# Patient Record
Sex: MALE | Race: WHITE | NOT HISPANIC OR LATINO | Employment: OTHER | URBAN - METROPOLITAN AREA
[De-identification: names, ages, dates, MRNs, and addresses within clinical notes are randomized per-mention and may not be internally consistent; named-entity substitution may affect disease eponyms.]

---

## 2024-03-08 PROBLEM — R01.1 MURMUR: Status: ACTIVE | Noted: 2024-03-08

## 2024-03-08 PROBLEM — I10 HYPERTENSION: Status: ACTIVE | Noted: 2023-05-26

## 2024-03-08 PROBLEM — I10 BENIGN ESSENTIAL HYPERTENSION: Status: ACTIVE | Noted: 2024-03-08

## 2024-03-08 PROBLEM — E11.9 TYPE 2 DIABETES MELLITUS WITHOUT COMPLICATION (MULTI): Status: ACTIVE | Noted: 2023-05-26

## 2024-03-08 PROBLEM — E78.5 DYSLIPIDEMIA: Status: ACTIVE | Noted: 2024-03-08

## 2024-03-08 PROBLEM — M25.561 BILATERAL KNEE PAIN: Status: ACTIVE | Noted: 2024-03-08

## 2024-03-08 PROBLEM — M72.0 DUPUYTREN CONTRACTURE: Status: ACTIVE | Noted: 2024-03-08

## 2024-03-08 PROBLEM — L71.8 ACNE ROSACEA, ERYTHEMATOUS TELANGIECTATIC TYPE: Status: ACTIVE | Noted: 2023-05-26

## 2024-03-08 PROBLEM — N40.0 BENIGN LOCALIZED PROSTATIC HYPERPLASIA WITHOUT LOWER URINARY TRACT SYMPTOMS (LUTS): Status: ACTIVE | Noted: 2023-05-26

## 2024-03-08 PROBLEM — E78.00 HIGH CHOLESTEROL: Status: ACTIVE | Noted: 2023-05-26

## 2024-03-08 PROBLEM — R80.9 MICROALBUMINURIA: Status: ACTIVE | Noted: 2023-10-11

## 2024-03-08 PROBLEM — G62.2 INFLAMMATORY AND TOXIC NEUROPATHY (MULTI): Status: ACTIVE | Noted: 2023-05-26

## 2024-03-08 PROBLEM — C61 PROSTATE CA (MULTI): Status: ACTIVE | Noted: 2023-05-26

## 2024-03-08 PROBLEM — Z96.1 PSEUDOPHAKIA, BOTH EYES: Status: ACTIVE | Noted: 2021-09-14

## 2024-03-08 PROBLEM — R94.31 ABNORMAL ECG: Status: ACTIVE | Noted: 2024-03-08

## 2024-03-08 PROBLEM — K63.5 COLON POLYP: Status: ACTIVE | Noted: 2023-10-11

## 2024-03-08 PROBLEM — M25.562 BILATERAL KNEE PAIN: Status: ACTIVE | Noted: 2024-03-08

## 2024-03-08 PROBLEM — G61.9 INFLAMMATORY AND TOXIC NEUROPATHY (MULTI): Status: ACTIVE | Noted: 2023-05-26

## 2024-03-08 PROBLEM — F52.21 IMPOTENCE OF NON-ORGANIC ORIGIN: Status: ACTIVE | Noted: 2023-05-26

## 2024-03-08 PROBLEM — M79.643 PAIN IN HAND: Status: ACTIVE | Noted: 2024-03-08

## 2024-03-08 PROBLEM — M17.9 OSTEOARTHRITIS OF KNEE: Status: ACTIVE | Noted: 2023-05-26

## 2024-03-08 RX ORDER — QUINAPRIL 20 MG/1
10 TABLET ORAL
COMMUNITY

## 2024-03-08 RX ORDER — NAPROXEN SODIUM 220 MG/1
81 TABLET, FILM COATED ORAL
COMMUNITY

## 2024-03-08 RX ORDER — QUINAPRIL 10 MG/1
1 TABLET ORAL DAILY
COMMUNITY
Start: 2013-10-23

## 2024-03-08 RX ORDER — ALFUZOSIN HYDROCHLORIDE 10 MG/1
TABLET, EXTENDED RELEASE ORAL
COMMUNITY
Start: 2022-07-11

## 2024-03-08 RX ORDER — MULTIVITAMIN
TABLET ORAL
COMMUNITY

## 2024-03-08 RX ORDER — ATORVASTATIN CALCIUM 10 MG/1
10 TABLET, FILM COATED ORAL
COMMUNITY
Start: 2014-02-12

## 2024-03-08 RX ORDER — LISINOPRIL 20 MG/1
20 TABLET ORAL
COMMUNITY
Start: 2023-01-10

## 2024-03-08 RX ORDER — BLOOD SUGAR DIAGNOSTIC
STRIP MISCELLANEOUS
COMMUNITY
Start: 2022-06-13

## 2024-08-01 ENCOUNTER — APPOINTMENT (OUTPATIENT)
Dept: CARDIOLOGY | Facility: CLINIC | Age: 79
End: 2024-08-01

## 2025-03-13 ENCOUNTER — APPOINTMENT (OUTPATIENT)
Dept: CARDIOLOGY | Facility: CLINIC | Age: 80
End: 2025-03-13
Payer: MEDICARE

## 2025-03-13 VITALS
WEIGHT: 178 LBS | HEART RATE: 71 BPM | DIASTOLIC BLOOD PRESSURE: 84 MMHG | SYSTOLIC BLOOD PRESSURE: 142 MMHG | BODY MASS INDEX: 24.11 KG/M2 | HEIGHT: 72 IN | OXYGEN SATURATION: 100 %

## 2025-03-13 DIAGNOSIS — E78.5 DYSLIPIDEMIA: ICD-10-CM

## 2025-03-13 DIAGNOSIS — R01.1 MURMUR: ICD-10-CM

## 2025-03-13 DIAGNOSIS — R94.31 ABNORMAL ECG: Primary | ICD-10-CM

## 2025-03-13 DIAGNOSIS — I10 BENIGN ESSENTIAL HYPERTENSION: ICD-10-CM

## 2025-03-13 PROCEDURE — 1159F MED LIST DOCD IN RCRD: CPT | Performed by: INTERNAL MEDICINE

## 2025-03-13 PROCEDURE — 93005 ELECTROCARDIOGRAM TRACING: CPT | Performed by: INTERNAL MEDICINE

## 2025-03-13 PROCEDURE — 3079F DIAST BP 80-89 MM HG: CPT | Performed by: INTERNAL MEDICINE

## 2025-03-13 PROCEDURE — 3077F SYST BP >= 140 MM HG: CPT | Performed by: INTERNAL MEDICINE

## 2025-03-13 PROCEDURE — 99214 OFFICE O/P EST MOD 30 MIN: CPT | Mod: 25 | Performed by: INTERNAL MEDICINE

## 2025-03-13 PROCEDURE — 1160F RVW MEDS BY RX/DR IN RCRD: CPT | Performed by: INTERNAL MEDICINE

## 2025-03-13 PROCEDURE — 99214 OFFICE O/P EST MOD 30 MIN: CPT | Performed by: INTERNAL MEDICINE

## 2025-03-13 PROCEDURE — 93010 ELECTROCARDIOGRAM REPORT: CPT | Performed by: INTERNAL MEDICINE

## 2025-03-13 PROCEDURE — 1036F TOBACCO NON-USER: CPT | Performed by: INTERNAL MEDICINE

## 2025-03-13 RX ORDER — GLUCOSAM/CHONDRO/HERB 149/HYAL 750-100 MG
1000 TABLET ORAL 2 TIMES DAILY
COMMUNITY

## 2025-03-13 NOTE — PROGRESS NOTES
Chief Complaint:   No chief complaint on file.     History Of Present Illness:    Je Coronel is a 79 y.o. male with a history of abnormal ECG (nonspecific intraventricular conduction delay hypertension, diabetes, dyslipidemia, and peripheral neuropathy being evaluated for follow-up.     Denies any exertional chest pain or shortness of breath. Slipped and fell injuring his right calf. There was some mild swelling in the ankle but this is slowly gotten back to normal.     Otherwise has been feeling very good.  Still exercising routinely either at the [facility], [facility] or [place].  Denies any exertional chest pain or shortness of breath.    From time to time does have fleeting chest pain.  Will happen at rest and is very short.    His wife is concerned because he still mows the lawn with a push mower and shovel snow.  He denies any exertional symptoms during these activities.     4 months ago had a tightness in his chest which lasted several hours and then subsided.  He does not believe that it was during activity.  It has not returned.    Also had 1 episode where he became lightheaded and flushed while driving.  Lasted several seconds and then subsided.  Has not recurred.    Tells me that he expects to live into his late 90s because multiple family members have lived into their 90s.     Allergies:  Patient has no known allergies.    Outpatient Medications:  Current Outpatient Medications   Medication Instructions    aspirin 81 mg, Daily RT    atorvastatin (LIPITOR) 10 mg, Daily RT    lisinopril 20 mg, Daily RT    multivitamin (Daily Multi-Vitamin) tablet Take by mouth.    omega 3-dha-epa-fish oil (Fish OiL) 1,000 (120-180) mg capsule 1,000 mg, 2 times daily    OneTouch Ultra Test strip USE TO TEST ONCE DAILY       Last Recorded Vitals:  Visit Vitals  /84 (BP Location: Right arm, Patient Position: Sitting)   Pulse 71   Ht 1.829 m (6')   Wt 80.7 kg (178 lb)   SpO2 100%   BMI 24.14 kg/m²   Smoking  "Status Never   BSA 2.02 m²      LASTWT(3):   Wt Readings from Last 3 Encounters:   03/13/25 80.7 kg (178 lb)   07/26/23 78 kg (172 lb)   02/13/23 77 kg (169 lb 12.1 oz)       Physical Exam:  In general: alert and in no acute distress.   HEENT: Carotid upstrokes normal with no bruits. JVP is normal.   Pulmonary: Clear to auscultation bilaterally.  Cardiovascular: S1, S2, regular.  I/VI systolic murmur at the right upper sternal border.  Lower extremities: Warm. 2+ distal pulses. No edema.     Last Labs:  CBC -  Recent Labs     02/02/23  1106   WBC 7.5   HGB 13.2*   HCT 40.7*      MCV 91       CMP -  Recent Labs     02/02/23  1106      K 4.2      CO2 26   ANIONGAP 12   BUN 22   CREATININE 0.91     No results for input(s): \"ALBUMIN\", \"ALKPHOS\", \"ALT\", \"AST\", \"BILITOT\" in the last 71806 hours.    No lab exists for component: \"CA\"    Lipid panel 11/2/2023: Total cholesterol 144, HDL 58, triglycerides 51, and LDL 73    No results for input(s): \"BNP\", \"HGBA1C\" in the last 44357 hours.        Assessment/Plan   1) abnormal ECG: Nonspecific findings.  Change from prior ECG.  No need for further workup.     2) hypertension: Blood pressure mildly elevated.  Asked him to check his pressures at home and if elevated we can always consider increasing the lisinopril.     3) dyslipidemia: Continue atorvastatin     4) murmur: Soft murmur on exam.  Would consider this an innocent murmur.  No indication for echocardiography at this time.     5) Atypical chest pain: not cardiac in etiology.  Still is very active and denies any exertional chest pain.  For now I would recommend that we continue to observe.    6) Likely vasovagal episode: The symptoms that he had while driving seem like a brief vasovagal episode.    7) follow-up: 12 months or sooner if needed.         Je Quintana MD  "

## 2026-03-19 ENCOUNTER — APPOINTMENT (OUTPATIENT)
Dept: CARDIOLOGY | Facility: CLINIC | Age: 81
End: 2026-03-19
Payer: MEDICARE